# Patient Record
Sex: MALE
[De-identification: names, ages, dates, MRNs, and addresses within clinical notes are randomized per-mention and may not be internally consistent; named-entity substitution may affect disease eponyms.]

---

## 2020-07-28 ENCOUNTER — NURSE TRIAGE (OUTPATIENT)
Dept: OTHER | Facility: CLINIC | Age: 35
End: 2020-07-28

## 2020-07-28 NOTE — TELEPHONE ENCOUNTER
Reason for Disposition   Patient wants to be seen    Answer Assessment - Initial Assessment Questions  1. ONSET: \"When did the pain begin? \"       Started 2 days ago  2. LOCATION: \"Where does it hurt? \" (upper, mid or lower back)  Lower right back side  3. SEVERITY: \"How bad is the pain? \"  (e.g., Scale 1-10; mild, moderate, or severe)    - MILD (1-3): doesn't interfere with normal activities     - MODERATE (4-7): interferes with normal activities or awakens from sleep     - SEVERE (8-10): excruciating pain, unable to do any normal activities       Not too bad right now  4. PATTERN: \"Is the pain constant? \" (e.g., yes, no; constant, intermittent)     constant  5. RADIATION: \"Does the pain shoot into your legs or elsewhere? \"  Will radiate to abdomen  6. CAUSE:  \"What do you think is causing the back pain? \"   unsure  7. BACK OVERUSE:  Annika Benson recent lifting of heavy objects, strenuous work or exercise?\"    8. MEDICATIONS: \"What have you taken so far for the pain? \" (e.g., nothing, acetaminophen, NSAIDS)  Tried tylenol and ibuprofen  9. NEUROLOGIC SYMPTOMS: \"Do you have any weakness, numbness, or problems with bowel/bladder control? \"    10. OTHER SYMPTOMS: \"Do you have any other symptoms? \" (e.g., fever, abdominal pain, burning with urination, blood in urine)      No urinary issues, no fever  11. PREGNANCY: \"Is there any chance you are pregnant? \" (e.g., yes, no; LMP)  na    Protocols used: BACK PAIN-ADULT-[place]    Pt calling with right lower back pain for 2 days. He is wanting to get in with  Gastroenterologist to look in to gallstones (was told in Feb he had gallstones). Pain is mild. Will radiate to abdomen at times. He has called and left message for office to call him. Recommend pt await call back, go to UC/ED if worsens.